# Patient Record
Sex: FEMALE | Race: BLACK OR AFRICAN AMERICAN | NOT HISPANIC OR LATINO
[De-identification: names, ages, dates, MRNs, and addresses within clinical notes are randomized per-mention and may not be internally consistent; named-entity substitution may affect disease eponyms.]

---

## 2018-12-19 PROBLEM — Z00.00 ENCOUNTER FOR PREVENTIVE HEALTH EXAMINATION: Status: ACTIVE | Noted: 2018-12-19

## 2019-01-04 ENCOUNTER — APPOINTMENT (OUTPATIENT)
Dept: OBGYN | Facility: CLINIC | Age: 39
End: 2019-01-04
Payer: COMMERCIAL

## 2019-01-04 PROCEDURE — 99242 OFF/OP CONSLTJ NEW/EST SF 20: CPT

## 2019-01-23 ENCOUNTER — OUTPATIENT (OUTPATIENT)
Dept: OUTPATIENT SERVICES | Facility: HOSPITAL | Age: 39
LOS: 1 days | End: 2019-01-23
Payer: COMMERCIAL

## 2019-01-23 VITALS
TEMPERATURE: 98 F | HEIGHT: 64 IN | OXYGEN SATURATION: 98 % | SYSTOLIC BLOOD PRESSURE: 130 MMHG | RESPIRATION RATE: 16 BRPM | DIASTOLIC BLOOD PRESSURE: 82 MMHG | HEART RATE: 78 BPM | WEIGHT: 188.05 LBS

## 2019-01-23 DIAGNOSIS — Z01.818 ENCOUNTER FOR OTHER PREPROCEDURAL EXAMINATION: ICD-10-CM

## 2019-01-23 DIAGNOSIS — D25.0 SUBMUCOUS LEIOMYOMA OF UTERUS: ICD-10-CM

## 2019-01-23 DIAGNOSIS — D25.9 LEIOMYOMA OF UTERUS, UNSPECIFIED: ICD-10-CM

## 2019-01-23 PROCEDURE — G0463: CPT

## 2019-01-23 RX ORDER — LIDOCAINE HCL 20 MG/ML
0.2 VIAL (ML) INJECTION ONCE
Qty: 0 | Refills: 0 | Status: DISCONTINUED | OUTPATIENT
Start: 2019-01-28 | End: 2019-02-12

## 2019-01-23 RX ORDER — SODIUM CHLORIDE 9 MG/ML
3 INJECTION INTRAMUSCULAR; INTRAVENOUS; SUBCUTANEOUS EVERY 8 HOURS
Qty: 0 | Refills: 0 | Status: DISCONTINUED | OUTPATIENT
Start: 2019-01-28 | End: 2019-02-12

## 2019-01-23 NOTE — H&P PST ADULT - NSANTHOSAYNRD_GEN_A_CORE
No. GENEVIEVE screening performed.  STOP BANG Legend: 0-2 = LOW Risk; 3-4 = INTERMEDIATE Risk; 5-8 = HIGH Risk

## 2019-01-23 NOTE — H&P PST ADULT - HISTORY OF PRESENT ILLNESS
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

## 2019-01-24 ENCOUNTER — EMERGENCY (EMERGENCY)
Facility: HOSPITAL | Age: 39
LOS: 1 days | Discharge: HOME | End: 2019-01-24
Attending: EMERGENCY MEDICINE
Payer: COMMERCIAL

## 2019-01-24 ENCOUNTER — TRANSCRIPTION ENCOUNTER (OUTPATIENT)
Age: 39
End: 2019-01-24

## 2019-01-24 VITALS
OXYGEN SATURATION: 98 % | TEMPERATURE: 98 F | SYSTOLIC BLOOD PRESSURE: 123 MMHG | RESPIRATION RATE: 18 BRPM | HEART RATE: 62 BPM | DIASTOLIC BLOOD PRESSURE: 83 MMHG

## 2019-01-24 VITALS
WEIGHT: 190.04 LBS | OXYGEN SATURATION: 100 % | RESPIRATION RATE: 18 BRPM | SYSTOLIC BLOOD PRESSURE: 138 MMHG | TEMPERATURE: 98 F | DIASTOLIC BLOOD PRESSURE: 95 MMHG | HEART RATE: 82 BPM | HEIGHT: 66 IN

## 2019-01-24 LAB
ALBUMIN SERPL ELPH-MCNC: 4.1 G/DL — SIGNIFICANT CHANGE UP (ref 3.3–5)
ALP SERPL-CCNC: 63 U/L — SIGNIFICANT CHANGE UP (ref 40–120)
ALT FLD-CCNC: 15 U/L — SIGNIFICANT CHANGE UP (ref 10–45)
ANION GAP SERPL CALC-SCNC: 10 MMOL/L — SIGNIFICANT CHANGE UP (ref 5–17)
AST SERPL-CCNC: 14 U/L — SIGNIFICANT CHANGE UP (ref 10–40)
BILIRUB SERPL-MCNC: 0.3 MG/DL — SIGNIFICANT CHANGE UP (ref 0.2–1.2)
BUN SERPL-MCNC: 10 MG/DL — SIGNIFICANT CHANGE UP (ref 7–23)
CALCIUM SERPL-MCNC: 9 MG/DL — SIGNIFICANT CHANGE UP (ref 8.4–10.5)
CHLORIDE SERPL-SCNC: 104 MMOL/L — SIGNIFICANT CHANGE UP (ref 96–108)
CO2 SERPL-SCNC: 25 MMOL/L — SIGNIFICANT CHANGE UP (ref 22–31)
CREAT SERPL-MCNC: 0.81 MG/DL — SIGNIFICANT CHANGE UP (ref 0.5–1.3)
GLUCOSE SERPL-MCNC: 84 MG/DL — SIGNIFICANT CHANGE UP (ref 70–99)
HCG UR QL: NEGATIVE — SIGNIFICANT CHANGE UP
HCT VFR BLD CALC: 32.3 % — LOW (ref 34.5–45)
HGB BLD-MCNC: 11 G/DL — LOW (ref 11.5–15.5)
MCHC RBC-ENTMCNC: 29.7 PG — SIGNIFICANT CHANGE UP (ref 27–34)
MCHC RBC-ENTMCNC: 33.9 GM/DL — SIGNIFICANT CHANGE UP (ref 32–36)
MCV RBC AUTO: 87.6 FL — SIGNIFICANT CHANGE UP (ref 80–100)
PLATELET # BLD AUTO: 327 K/UL — SIGNIFICANT CHANGE UP (ref 150–400)
POTASSIUM SERPL-MCNC: 3.4 MMOL/L — LOW (ref 3.5–5.3)
POTASSIUM SERPL-SCNC: 3.4 MMOL/L — LOW (ref 3.5–5.3)
PROT SERPL-MCNC: 7.4 G/DL — SIGNIFICANT CHANGE UP (ref 6–8.3)
RBC # BLD: 3.69 M/UL — LOW (ref 3.8–5.2)
RBC # FLD: 13.8 % — SIGNIFICANT CHANGE UP (ref 10.3–14.5)
SODIUM SERPL-SCNC: 139 MMOL/L — SIGNIFICANT CHANGE UP (ref 135–145)
WBC # BLD: 6.3 K/UL — SIGNIFICANT CHANGE UP (ref 3.8–10.5)
WBC # FLD AUTO: 6.3 K/UL — SIGNIFICANT CHANGE UP (ref 3.8–10.5)

## 2019-01-24 PROCEDURE — 81025 URINE PREGNANCY TEST: CPT

## 2019-01-24 PROCEDURE — 85027 COMPLETE CBC AUTOMATED: CPT

## 2019-01-24 PROCEDURE — 71046 X-RAY EXAM CHEST 2 VIEWS: CPT

## 2019-01-24 PROCEDURE — 80053 COMPREHEN METABOLIC PANEL: CPT

## 2019-01-24 PROCEDURE — 93010 ELECTROCARDIOGRAM REPORT: CPT | Mod: NC

## 2019-01-24 PROCEDURE — 71046 X-RAY EXAM CHEST 2 VIEWS: CPT | Mod: 26

## 2019-01-24 PROCEDURE — 93005 ELECTROCARDIOGRAM TRACING: CPT

## 2019-01-24 PROCEDURE — 99284 EMERGENCY DEPT VISIT MOD MDM: CPT | Mod: 25

## 2019-01-24 RX ORDER — POTASSIUM CHLORIDE 20 MEQ
40 PACKET (EA) ORAL ONCE
Qty: 0 | Refills: 0 | Status: DISCONTINUED | OUTPATIENT
Start: 2019-01-24 | End: 2019-01-28

## 2019-01-24 RX ORDER — IBUPROFEN 200 MG
600 TABLET ORAL ONCE
Qty: 0 | Refills: 0 | Status: DISCONTINUED | OUTPATIENT
Start: 2019-01-24 | End: 2019-01-24

## 2019-01-24 NOTE — ED PROVIDER NOTE - ATTENDING CONTRIBUTION TO CARE
------------ATTENDING NOTE------------   pt c/o gradual onset mild constant L sided chest pain, some radiation to L shoulder, constant past 4 hrs, no sob/dyspnea or exertional symptoms, has daily chronic uterine bleeding from fibroid and felt lightheaded near passing out 4 days ago, HD stable, clear chest, equal distal pulses -->  - Iván Ortega MD   ---------------------------------------------- ------------ATTENDING NOTE------------   pt c/o gradual onset mild constant L sided chest pain, some radiation to L shoulder, constant past 4 hrs, no sob/dyspnea or exertional symptoms, has daily chronic uterine bleeding from fibroid and felt lightheaded near passing out 4 days ago, HD stable, clear chest, equal distal pulses, no pelvic pain, declining  exam --> labs / imaging wnl, remained asymptomatic entire ED course after initial evaluation w/ NSR and nml VS on cardiac monitor, no additional complaints / concerns, benign repeat exams, in depth dw all about ddx, tx, hollis, continued close outpt fu.  - Iván Ortega MD   ----------------------------------------------

## 2019-01-24 NOTE — ED PROVIDER NOTE - NSFOLLOWUPINSTRUCTIONS_ED_ALL_ED_FT
Follow up with your Primary Doctor and OBGYN in next week for reevaluation.    Stay well hydrated, take time changing position / standing up.    Seek immediate medical care for new/worsening symptoms/concerns.

## 2019-01-24 NOTE — ED CLERICAL - NS ED CLERK NOTE PRE-ARRIVAL INFORMATION; ADDITIONAL PRE-ARRIVAL INFORMATION
CC/Reason For referral:chest pain  Preferred Consultant(if applicable):n/a  Who admits for you (if needed):n/a  Do you have documents you would like to fax over?no  Would you still like to speak to an ED attending?no but please give a call back to LUCA Bennett  [phone]

## 2019-01-24 NOTE — ED PROVIDER NOTE - OBJECTIVE STATEMENT
38F p/w chest pain for 3 hours. She describes a sharp pain in the chest just below her left shoulder. Associated w/ dyspnea for weeks-months which is not worse today. Notes hx irregular vaginal bleeding, dx submucosal fibroid scheduled for removal in a few days. No change in bleeding pattern or worse bleeding recently.    PMH: fibroids  Meds: OCP

## 2019-01-24 NOTE — ED PROVIDER NOTE - CARE PLAN
Principal Discharge DX:	Chest pain of uncertain etiology  Secondary Diagnosis:	Dysfunctional uterine bleeding

## 2019-01-27 ENCOUNTER — TRANSCRIPTION ENCOUNTER (OUTPATIENT)
Age: 39
End: 2019-01-27

## 2019-01-28 ENCOUNTER — OUTPATIENT (OUTPATIENT)
Dept: OUTPATIENT SERVICES | Facility: HOSPITAL | Age: 39
LOS: 1 days | End: 2019-01-28
Payer: COMMERCIAL

## 2019-01-28 ENCOUNTER — RESULT REVIEW (OUTPATIENT)
Age: 39
End: 2019-01-28

## 2019-01-28 ENCOUNTER — APPOINTMENT (OUTPATIENT)
Dept: OBGYN | Facility: HOSPITAL | Age: 39
End: 2019-01-28

## 2019-01-28 VITALS
OXYGEN SATURATION: 100 % | HEART RATE: 86 BPM | SYSTOLIC BLOOD PRESSURE: 120 MMHG | TEMPERATURE: 97 F | RESPIRATION RATE: 16 BRPM | DIASTOLIC BLOOD PRESSURE: 71 MMHG

## 2019-01-28 VITALS
HEART RATE: 83 BPM | DIASTOLIC BLOOD PRESSURE: 78 MMHG | TEMPERATURE: 99 F | RESPIRATION RATE: 16 BRPM | HEIGHT: 64 IN | OXYGEN SATURATION: 100 % | SYSTOLIC BLOOD PRESSURE: 115 MMHG | WEIGHT: 188.05 LBS

## 2019-01-28 DIAGNOSIS — D25.0 SUBMUCOUS LEIOMYOMA OF UTERUS: ICD-10-CM

## 2019-01-28 PROCEDURE — 58561 HYSTEROSCOPY REMOVE MYOMA: CPT

## 2019-01-28 PROCEDURE — 88305 TISSUE EXAM BY PATHOLOGIST: CPT

## 2019-01-28 PROCEDURE — 88305 TISSUE EXAM BY PATHOLOGIST: CPT | Mod: 26

## 2019-01-28 RX ORDER — SODIUM CHLORIDE 9 MG/ML
1000 INJECTION, SOLUTION INTRAVENOUS
Qty: 0 | Refills: 0 | Status: DISCONTINUED | OUTPATIENT
Start: 2019-01-28 | End: 2019-02-12

## 2019-01-28 RX ORDER — ACETAMINOPHEN 500 MG
1000 TABLET ORAL ONCE
Qty: 0 | Refills: 0 | Status: COMPLETED | OUTPATIENT
Start: 2019-01-28 | End: 2019-01-28

## 2019-01-28 RX ORDER — CELECOXIB 200 MG/1
200 CAPSULE ORAL ONCE
Qty: 0 | Refills: 0 | Status: COMPLETED | OUTPATIENT
Start: 2019-01-28 | End: 2019-01-28

## 2019-01-28 RX ORDER — ONDANSETRON 8 MG/1
4 TABLET, FILM COATED ORAL ONCE
Qty: 0 | Refills: 0 | Status: COMPLETED | OUTPATIENT
Start: 2019-01-28 | End: 2019-01-28

## 2019-01-28 RX ORDER — OXYCODONE HYDROCHLORIDE 5 MG/1
5 TABLET ORAL ONCE
Qty: 0 | Refills: 0 | Status: DISCONTINUED | OUTPATIENT
Start: 2019-01-28 | End: 2019-01-28

## 2019-01-28 RX ADMIN — ONDANSETRON 4 MILLIGRAM(S): 8 TABLET, FILM COATED ORAL at 10:46

## 2019-01-28 RX ADMIN — CELECOXIB 200 MILLIGRAM(S): 200 CAPSULE ORAL at 11:10

## 2019-01-28 RX ADMIN — Medication 1000 MILLIGRAM(S): at 08:42

## 2019-01-28 RX ADMIN — CELECOXIB 200 MILLIGRAM(S): 200 CAPSULE ORAL at 08:42

## 2019-01-28 RX ADMIN — OXYCODONE HYDROCHLORIDE 5 MILLIGRAM(S): 5 TABLET ORAL at 10:46

## 2019-01-28 RX ADMIN — CELECOXIB 200 MILLIGRAM(S): 200 CAPSULE ORAL at 10:46

## 2019-01-28 RX ADMIN — OXYCODONE HYDROCHLORIDE 5 MILLIGRAM(S): 5 TABLET ORAL at 11:10

## 2019-01-28 NOTE — ASU DISCHARGE PLAN (ADULT/PEDIATRIC). - MEDICATION SUMMARY - MEDICATIONS TO TAKE
I will START or STAY ON the medications listed below when I get home from the hospital:    Tylenol 325 mg oral tablet  -- 2 tab(s) by mouth every 6 hours, As Needed  -- Indication: For pain    ibuprofen 400 mg oral tablet  -- 1 tab(s) by mouth every 6 hours, As Needed  -- Indication: For pain

## 2019-01-28 NOTE — ASU DISCHARGE PLAN (ADULT/PEDIATRIC). - ACTIVITY LEVEL
no heavy lifting/no intercourse/weight bearing as tolerated/nothing per vagina/no douching/nothing per rectum/no tub baths/no tampons x 2 weeks/nothing per vagina/nothing per rectum/no tampons/no intercourse/no heavy lifting/weight bearing as tolerated/no tub baths/no douching

## 2019-01-28 NOTE — ASU DISCHARGE PLAN (ADULT/PEDIATRIC). - NOTIFY
Inability to Tolerate Liquids or Foods/Bleeding that does not stop/Pain not relieved by Medications/GYN Fever>100.4

## 2019-01-28 NOTE — BRIEF OPERATIVE NOTE - PROCEDURE
<<-----Click on this checkbox to enter Procedure Resection of submucosal fibroid of uterus  01/28/2019    Active  [email or web address]  Operative hysteroscopy with dilation and curettage of uterus if indicated  01/28/2019    Active  RAYRAY

## 2019-01-28 NOTE — ASU DISCHARGE PLAN (ADULT/PEDIATRIC). - ITEMS TO FOLLOWUP WITH YOUR PHYSICIAN'S
Expect abdominal cramping/pain and spotting for the next weeks. Take ibuprofen and Tylenol for cramping. Use a pad as needed. Call your physician or go to the emergency room if you experience any of the following: heavy vaginal bleeding (soaking more than 2 pads in 1 hour for 2 hours), fever, chills, nausea, vomiting, or pain that is not controlled by medication. Follow-up with Dr. Xie in 2 weeks.

## 2019-01-30 LAB — SURGICAL PATHOLOGY STUDY: SIGNIFICANT CHANGE UP

## 2019-02-11 ENCOUNTER — APPOINTMENT (OUTPATIENT)
Dept: OBGYN | Facility: CLINIC | Age: 39
End: 2019-02-11
Payer: COMMERCIAL

## 2019-02-11 PROCEDURE — 99213 OFFICE O/P EST LOW 20 MIN: CPT

## 2019-02-12 PROBLEM — D25.9 LEIOMYOMA OF UTERUS, UNSPECIFIED: Chronic | Status: ACTIVE | Noted: 2019-01-23

## 2019-03-19 ENCOUNTER — APPOINTMENT (OUTPATIENT)
Dept: OBGYN | Facility: CLINIC | Age: 39
End: 2019-03-19
Payer: COMMERCIAL

## 2019-03-19 PROCEDURE — 58340 CATHETER FOR HYSTEROGRAPHY: CPT

## 2019-03-19 PROCEDURE — 76831 ECHO EXAM UTERUS: CPT

## 2019-04-16 ENCOUNTER — APPOINTMENT (OUTPATIENT)
Dept: OBGYN | Facility: CLINIC | Age: 39
End: 2019-04-16
Payer: COMMERCIAL

## 2019-04-16 PROCEDURE — 99214 OFFICE O/P EST MOD 30 MIN: CPT

## 2019-06-06 ENCOUNTER — OUTPATIENT (OUTPATIENT)
Dept: OUTPATIENT SERVICES | Facility: HOSPITAL | Age: 39
LOS: 1 days | End: 2019-06-06
Payer: COMMERCIAL

## 2019-06-06 VITALS
HEART RATE: 65 BPM | HEIGHT: 66 IN | SYSTOLIC BLOOD PRESSURE: 129 MMHG | OXYGEN SATURATION: 100 % | DIASTOLIC BLOOD PRESSURE: 86 MMHG | RESPIRATION RATE: 20 BRPM | WEIGHT: 192.02 LBS | TEMPERATURE: 99 F

## 2019-06-06 DIAGNOSIS — D25.9 LEIOMYOMA OF UTERUS, UNSPECIFIED: ICD-10-CM

## 2019-06-06 DIAGNOSIS — Z01.818 ENCOUNTER FOR OTHER PREPROCEDURAL EXAMINATION: ICD-10-CM

## 2019-06-06 DIAGNOSIS — D25.0 SUBMUCOUS LEIOMYOMA OF UTERUS: ICD-10-CM

## 2019-06-06 DIAGNOSIS — Z98.890 OTHER SPECIFIED POSTPROCEDURAL STATES: Chronic | ICD-10-CM

## 2019-06-06 LAB
HCT VFR BLD CALC: 34.3 % — LOW (ref 34.5–45)
HGB BLD-MCNC: 10.8 G/DL — LOW (ref 11.5–15.5)
MCHC RBC-ENTMCNC: 27.3 PG — SIGNIFICANT CHANGE UP (ref 27–34)
MCHC RBC-ENTMCNC: 31.5 GM/DL — LOW (ref 32–36)
MCV RBC AUTO: 86.8 FL — SIGNIFICANT CHANGE UP (ref 80–100)
PLATELET # BLD AUTO: 383 K/UL — SIGNIFICANT CHANGE UP (ref 150–400)
RBC # BLD: 3.95 M/UL — SIGNIFICANT CHANGE UP (ref 3.8–5.2)
RBC # FLD: 15.5 % — HIGH (ref 10.3–14.5)
WBC # BLD: 6.64 K/UL — SIGNIFICANT CHANGE UP (ref 3.8–10.5)
WBC # FLD AUTO: 6.64 K/UL — SIGNIFICANT CHANGE UP (ref 3.8–10.5)

## 2019-06-06 PROCEDURE — G0463: CPT

## 2019-06-06 PROCEDURE — 85027 COMPLETE CBC AUTOMATED: CPT

## 2019-06-06 RX ORDER — LIDOCAINE HCL 20 MG/ML
0.2 VIAL (ML) INJECTION ONCE
Refills: 0 | Status: DISCONTINUED | OUTPATIENT
Start: 2019-06-13 | End: 2019-06-28

## 2019-06-06 RX ORDER — ACETAMINOPHEN 500 MG
2 TABLET ORAL
Qty: 0 | Refills: 0 | DISCHARGE

## 2019-06-06 RX ORDER — SODIUM CHLORIDE 9 MG/ML
3 INJECTION INTRAMUSCULAR; INTRAVENOUS; SUBCUTANEOUS EVERY 8 HOURS
Refills: 0 | Status: DISCONTINUED | OUTPATIENT
Start: 2019-06-13 | End: 2019-06-28

## 2019-06-06 RX ORDER — IBUPROFEN 200 MG
1 TABLET ORAL
Qty: 0 | Refills: 0 | DISCHARGE

## 2019-06-06 NOTE — H&P PST ADULT - HISTORY OF PRESENT ILLNESS
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 Had sonogram that revealed submucous leiomyoma . Now coming in for D& C, Operative hysteroscopy, Myomectomy on 19.

## 2019-06-06 NOTE — H&P PST ADULT - RS GEN PE MLT RESP DETAILS PC
normal/respirations non-labored/airway patent/clear to auscultation bilaterally/breath sounds equal/good air movement

## 2019-06-06 NOTE — H&P PST ADULT - NSICDXPROBLEM_GEN_ALL_CORE_FT
PROBLEM DIAGNOSES  Problem: Uterine fibroid  Assessment and Plan: D& C, Operative hysteroscopy, Myomectomy on 6/13/19.

## 2019-06-12 ENCOUNTER — TRANSCRIPTION ENCOUNTER (OUTPATIENT)
Age: 39
End: 2019-06-12

## 2019-06-13 ENCOUNTER — RESULT REVIEW (OUTPATIENT)
Age: 39
End: 2019-06-13

## 2019-06-13 ENCOUNTER — APPOINTMENT (OUTPATIENT)
Dept: OBGYN | Facility: HOSPITAL | Age: 39
End: 2019-06-13

## 2019-06-13 ENCOUNTER — APPOINTMENT (OUTPATIENT)
Dept: ULTRASOUND IMAGING | Facility: HOSPITAL | Age: 39
End: 2019-06-13

## 2019-06-13 ENCOUNTER — OUTPATIENT (OUTPATIENT)
Dept: OUTPATIENT SERVICES | Facility: HOSPITAL | Age: 39
LOS: 1 days | End: 2019-06-13
Payer: COMMERCIAL

## 2019-06-13 VITALS
SYSTOLIC BLOOD PRESSURE: 141 MMHG | OXYGEN SATURATION: 100 % | TEMPERATURE: 98 F | DIASTOLIC BLOOD PRESSURE: 90 MMHG | RESPIRATION RATE: 22 BRPM | HEART RATE: 90 BPM

## 2019-06-13 VITALS
HEART RATE: 77 BPM | OXYGEN SATURATION: 100 % | DIASTOLIC BLOOD PRESSURE: 76 MMHG | WEIGHT: 192.02 LBS | TEMPERATURE: 99 F | SYSTOLIC BLOOD PRESSURE: 112 MMHG | RESPIRATION RATE: 18 BRPM | HEIGHT: 66 IN

## 2019-06-13 DIAGNOSIS — Z98.890 OTHER SPECIFIED POSTPROCEDURAL STATES: Chronic | ICD-10-CM

## 2019-06-13 DIAGNOSIS — D25.0 SUBMUCOUS LEIOMYOMA OF UTERUS: ICD-10-CM

## 2019-06-13 PROCEDURE — 58561 HYSTEROSCOPY REMOVE MYOMA: CPT | Mod: 80

## 2019-06-13 PROCEDURE — 88305 TISSUE EXAM BY PATHOLOGIST: CPT | Mod: 26

## 2019-06-13 PROCEDURE — 58561 HYSTEROSCOPY REMOVE MYOMA: CPT

## 2019-06-13 RX ORDER — CELECOXIB 200 MG/1
200 CAPSULE ORAL ONCE
Refills: 0 | Status: COMPLETED | OUTPATIENT
Start: 2019-06-13 | End: 2019-06-13

## 2019-06-13 RX ORDER — OXYCODONE HYDROCHLORIDE 5 MG/1
5 TABLET ORAL ONCE
Refills: 0 | Status: DISCONTINUED | OUTPATIENT
Start: 2019-06-13 | End: 2019-06-13

## 2019-06-13 RX ORDER — ONDANSETRON 8 MG/1
4 TABLET, FILM COATED ORAL ONCE
Refills: 0 | Status: DISCONTINUED | OUTPATIENT
Start: 2019-06-13 | End: 2019-06-28

## 2019-06-13 RX ORDER — HYDROMORPHONE HYDROCHLORIDE 2 MG/ML
0.25 INJECTION INTRAMUSCULAR; INTRAVENOUS; SUBCUTANEOUS
Refills: 0 | Status: DISCONTINUED | OUTPATIENT
Start: 2019-06-13 | End: 2019-06-13

## 2019-06-13 RX ORDER — SODIUM CHLORIDE 9 MG/ML
1000 INJECTION, SOLUTION INTRAVENOUS
Refills: 0 | Status: DISCONTINUED | OUTPATIENT
Start: 2019-06-13 | End: 2019-06-28

## 2019-06-13 RX ORDER — ACETAMINOPHEN 500 MG
1000 TABLET ORAL ONCE
Refills: 0 | Status: COMPLETED | OUTPATIENT
Start: 2019-06-13 | End: 2019-06-13

## 2019-06-13 RX ADMIN — Medication 1000 MILLIGRAM(S): at 14:00

## 2019-06-13 RX ADMIN — CELECOXIB 200 MILLIGRAM(S): 200 CAPSULE ORAL at 14:00

## 2019-06-13 RX ADMIN — CELECOXIB 200 MILLIGRAM(S): 200 CAPSULE ORAL at 17:10

## 2019-06-13 NOTE — BRIEF OPERATIVE NOTE - NSICDXBRIEFPOSTOP_GEN_ALL_CORE_FT
POST-OP DIAGNOSIS:  Submucous myoma of uterus 13-Jun-2019 16:46:53  Dena Bernardo  Heavy menstrual bleeding 13-Jun-2019 16:46:46  Dena Bernardo

## 2019-06-13 NOTE — BRIEF OPERATIVE NOTE - NSICDXBRIEFPREOP_GEN_ALL_CORE_FT
PRE-OP DIAGNOSIS:  Submucous myoma of uterus 13-Jun-2019 16:46:37  Dena Bernardo  Heavy menstrual bleeding 13-Jun-2019 16:46:30  Dena Bernardo

## 2019-06-13 NOTE — ASU DISCHARGE PLAN (ADULT/PEDIATRIC) - CARE PROVIDER_API CALL
Rivka Xie (MD)  Obstetrics and Gynecology  [address] [place] [address], [address]  [place] [address]  Phone: [phone]  Fax: [phone]  Follow Up Time:

## 2019-06-13 NOTE — BRIEF OPERATIVE NOTE - NSICDXBRIEFPROCEDURE_GEN_ALL_CORE_FT
PROCEDURES:  Hysteroscopy, with dilation and curettage 13-Jun-2019 16:46:21  Dena Bernardo  Hysteroscopic myomectomy 13-Jun-2019 16:46:09  Dena Bernardo

## 2019-06-13 NOTE — ASU DISCHARGE PLAN (ADULT/PEDIATRIC) - CALL YOUR DOCTOR IF YOU HAVE ANY OF THE FOLLOWING:
Swelling that gets worse/Fever greater than (need to indicate Fahrenheit or Celsius)/Excessive diarrhea/Inability to tolerate liquids or foods/Bleeding that does not stop/Numbness, tingling, color or temperature change to extremity/Nausea and vomiting that does not stop/Pain not relieved by Medications/Unable to urinate/Wound/Surgical Site with redness, or foul smelling discharge or pus/Increased irritability or sluggishness

## 2019-06-19 LAB — SURGICAL PATHOLOGY STUDY: SIGNIFICANT CHANGE UP

## 2019-06-24 ENCOUNTER — APPOINTMENT (OUTPATIENT)
Dept: OBGYN | Facility: CLINIC | Age: 39
End: 2019-06-24
Payer: COMMERCIAL

## 2019-06-24 PROCEDURE — 99213 OFFICE O/P EST LOW 20 MIN: CPT

## 2019-06-25 PROCEDURE — 88305 TISSUE EXAM BY PATHOLOGIST: CPT

## 2019-06-25 PROCEDURE — 58561 HYSTEROSCOPY REMOVE MYOMA: CPT

## 2020-06-10 NOTE — ED ADULT NURSE NOTE - OBJECTIVE STATEMENT
83
38 y.o F arrived to ED c/o L upper chest/L shoulder pain beginning today x4 hours. A&Ox3 ambulatory. Pt states she has also had ongoing intermittent vaginal bleeding r/t uterine fibroid diagnosed beginning of last year. also endorses intermittent SOB recently worse on exertion. Respirations nonlabored pt in no acute distress abdomen soft neuro intact able to move all extremities. Denies N/V/D, fevers, chills, HA, dizziness, pain/burning upon urination, abdominal pain, back pain. Safety and comfort provided/maintained.

## 2020-11-24 NOTE — ASU PREOP CHECKLIST - IDENTIFICATION BAND VERIFIED
Chief Complaint   Patient presents with     Port Draw     Labs drawn from port by RN in lab. Vitals taken. Checked into next appointment.      Port accessed with 20 gauge gripper needle by RN in lab.  Port flushed with saline and heparin.  Vital signs taken.  Pt checked in to next appointment.    Miladis Roldan RN    
done

## 2021-01-26 NOTE — ASU PREOP CHECKLIST - TEMPERATURE IN FAHRENHEIT (DEGREES F)
[address] Mejia Howard)  Urology  [address] [place] [address], [address]  [place] [address]  Phone: [phone]  Fax: [phone]  Follow Up Time: 1-3 Days    Vern Kemp)  EndocrinologyMetabDiabetes; Internal Medicine  [address] [place] [address]  Phone: [phone]  Fax: [phone]  Follow Up Time: 1-3 Days

## 2021-05-05 NOTE — PRE-ANESTHESIA EVALUATION ADULT - NSANTHOSAYNRD_GEN_A_CORE
Orthopaedic Surgery - Office Note  Marta Esteban (27 y o  female)   : 2001   MRN: [id number]  Encounter Date: 2021    Chief Complaint   Patient presents with    Right Knee - Follow-up       Assessment / Plan  Right knee grade 1 LCL and popliteus strains and nondisplaced lateral tibial plateau fracture    · Activity as tolerated   · Continue to monitor "pop" but it will likely resolve  If it continues or worsens we will order an MRI to further evaluate  Her knee feels stable upon physical exam  · School note given excusing her absence this morning  Return if symptoms worsen or fail to improve  History of Present Illness  Marta Esteban is a 27 y o  female who presents for follow right knee grade 1 LCL and popliteus strains and nondisplaced lateral tibial plateau fracture DOI 2021  She has d/c the use of the brace and crutches with no pain or discomfort  She has tried some running but hasn't returned to her full level of previous activity as she feels a little uncomfortable doing so without the help of PT  She states her knee does feel stable  She does sometimes experience a "pop" around her lateral tibial plateau, which she feels is random and doesn't hurt  Overall, she does she feel that she is doing better  She denies any radiating symptoms  Review of Systems  Pertinent items are noted in HPI  All other systems were reviewed and are negative  Physical Exam  /74   Pulse 80   Ht 5' 4" (1 626 m)   Wt 55 8 kg (123 lb)   BMI 21 11 kg/m²   Cons: Appears well  No apparent distress  Psych: Alert  Oriented x3  Mood and affect normal   Eyes: PERRLA, EOMI  Resp: Normal effort  No audible wheezing or stridor  CV: Palpable pulse  No discernable arrhythmia  No LE edema  Lymph:  No palpable cervical, axillary, or inguinal lymphadenopathy  Skin: Warm  No palpable masses  No visible lesions  Neuro: Normal muscle tone  Normal and symmetric DTR's       Right Knee Exam  Alignment: Normal knee alignment  Inspection:  No swelling  No erythema  No ecchymosis  Palpation:  No tenderness  No effusion  ROM:  Knee Extension 0  Knee Flexion 135  Strength:  5/5 quadriceps and hamstrings  Stability:  (-) Varus instability  (-) Valgus instability  (-) Lachman  Tests:  (-) Mekhi  Patella:  Normal patellar mobility  Neurovascular:  Sensation intact in DP/SP/Demarco/Sa/T nerve distributions  2+ DP & PT pulses  Gait:  Normal     Studies Reviewed  I have personally reviewed pertinent films in PACS  XR of right knee - from 2/4/2021 No acute fractures or dislocations  MRI of right knee - from 2/8/2021 Nondisplaced fracture of the lateral tibial plateau, grade I strains of LCL and popliteus tendon, fibular head bruising    Procedures  No procedures today  Medical, Surgical, Family, and Social History  The patient's medical history, family history, and social history, were reviewed and updated as appropriate      Past Medical History:   Diagnosis Date    Migraines        Past Surgical History:   Procedure Laterality Date    WISDOM TOOTH EXTRACTION         Family History   Problem Relation Age of Onset    Migraines Mother     Depression Father     Anxiety disorder Father     Thyroid disease Maternal Grandmother     Uterine cancer Maternal Grandmother     No Known Problems Maternal Grandfather     No Known Problems Paternal Grandmother     No Known Problems Paternal Grandfather     Mental illness Neg Hx     Substance Abuse Neg Hx        Social History     Occupational History    Not on file   Tobacco Use    Smoking status: Never Smoker    Smokeless tobacco: Never Used   Substance and Sexual Activity    Alcohol use: Never     Frequency: Never    Drug use: Never    Sexual activity: Yes     Partners: Male     Birth control/protection: Condom Male, Pill     Comment: lives with Mom, brother (1/2), step dad       No Known Allergies      Current Outpatient Medications:    No. GENEVIEVE screening performed.  STOP BANG Legend: 0-2 = LOW Risk; 3-4 = INTERMEDIATE Risk; 5-8 = HIGH Risk drospirenone-ethinyl estradiol (BINDU) 3-0 03 MG per tablet, Take 1 tablet by mouth daily (Patient not taking: Reported on 5/5/2021), Disp: 28 tablet, Rfl: 12    terconazole (TERAZOL 7) 0 4 % vaginal cream, Insert 1 applicator into the vagina daily at bedtime (Patient not taking: Reported on 5/5/2021), Disp: 45 g, Rfl: 0      [employer]    I,:  Cindi Herrera am acting as a scribe while in the presence of the attending physician :       I,:  Quintin Mclaughlin MD personally performed the services described in this documentation    as scribed in my presence :

## 2021-05-09 NOTE — ASU PATIENT PROFILE, ADULT - REASON FOR ADMISSION, PROFILE
Problem: At Risk for Falls  Goal: # Patient does not fall  Outcome: Outcome Met, Continue evaluating goal progress toward completion  Goal: # Takes action to control fall-related risks  Outcome: Outcome Met, Continue evaluating goal progress toward completion  Goal: # Verbalizes understanding of fall risk/precautions  Description: Document education using the patient education activity  Outcome: Outcome Met, Continue evaluating goal progress toward completion     Problem: At Risk for Injury Due to Fall  Goal: # Patient does not fall  Outcome: Outcome Met, Continue evaluating goal progress toward completion  Goal: # Takes action to control condition specific risks  Outcome: Outcome Met, Continue evaluating goal progress toward completion  Goal: # Verbalizes understanding of fall-related injury personal risks  Description: Document education using the patient education activity  Outcome: Outcome Met, Continue evaluating goal progress toward completion      for D&C, operative hysteroscopy, myomectomy

## 2022-02-28 NOTE — H&P PST ADULT - AS BP NONINV METHOD
[facility] VISIT NOTE       Chief Complaint   Patient presents with   • Back Pain       History Of Present Illness  Ilsa is a 74 year old female presenting with right sided back pain started this morning.no n/v/c/sob. Denies abnormal bowel and abnormal urinary sympotms       Past Medical History  Past Medical History:   Diagnosis Date   • Allergy         Surgical History  Past Surgical History:   Procedure Laterality Date   • Fracture surgery     • Hernia repair     • Hysterectomy     • Rotator cuff repair Right    • Tonsillectomy          Social History  Social History     Tobacco Use   • Smoking status: Never Smoker   • Smokeless tobacco: Never Used   Vaping Use   • Vaping Use: never used   Substance Use Topics   • Alcohol use: Never   • Drug use: Never       Family History    Family History   Problem Relation Age of Onset   • Diabetes Mother    • Stroke Brother         FH reviewed and negative for any heart or lung disease, bleeding disorders, or issues related to this complaint.     Allergies  ALLERGIES:  Aspirin    Medications  Current Outpatient Medications   Medication Sig   • sulfamethoxazole-trimethoprim (Bactrim DS) 800-160 MG per tablet Take 1 tablet by mouth 2 times daily for 5 days.   • fluconazole (DIFLUCAN) 150 MG tablet Take 1 tablet by mouth 1 time for 1 dose.   • cyclobenzaprine (FLEXERIL) 10 MG tablet Take 1 tablet by mouth 3 times daily as needed for Muscle spasms.   • atorvastatin (LIPITOR) 10 MG tablet Take 1 tablet by mouth once daily   • PEG 3350-KCl-NaBcb-NaCl-NaSulf (PEG-3350 and electrolytes) 236 g powder for oral solution Take 4,000 mLs by mouth 1 time. Drink first half of prep at 5 pm the day before procedure. Drink second half 5 hours prior to procedure.   • bisacodyl (DULCOLAX) 5 MG EC tablet Take 1 tablet by mouth 1 time.   • Ascorbic Acid (VITAMIN C PO) Take 1 tablet by mouth.     No current facility-administered medications for this visit.        Review of Systems  Constitutional:  Negative for fever and chills.  Skin: Negative for rash.  HEENT: Negative for eye drainage, ear pain, rhinorrhea, sinus congestion, or sore throat.  Respiratory: Negative wheezing, shortness of breath, or cough.  Cardiovascular: Negative for chest pain, chest pressure, palpitations or diaphoresis.  Gastrointestinal: Negative for nausea, vomiting, diarrhea or abdominal pain.  Genitourinary: Negative for dysuria, urgency, frequency, hematuria or flank pain.  Extremities:  Positive for back pain  Neurologic:  Negative for change in sensory or motor function.  Negative for headache.  Endocrine: Negative for heat or cold intolerance, weight loss or gain.  Hematological: Negative for bleeding, bruising or adenopathy.  Psychiatric: Negative for change in affect, change in mentation or sleep disturbance.  All other systems reviewed and otherwise negative unless noted.      Last Recorded Vitals  Vitals:    02/28/22 0953   BP: (!) 165/93   BP Location: LUE - Left upper extremity   Patient Position: Sitting   Cuff Size: Regular   Pulse: 68   Resp: 18   Temp: 97.2 °F (36.2 °C)   TempSrc: Temporal   SpO2: 100%   Weight: 79.8 kg (176 lb)        Physical Exam  Vitals and nursing note reviewed.   Constitutional:       General: She is not in acute distress.     Appearance: Normal appearance. She is not ill-appearing.   HENT:      Head: Normocephalic and atraumatic.      Nose: Nose normal.      Mouth/Throat:      Mouth: Mucous membranes are moist.      Pharynx: No oropharyngeal exudate or posterior oropharyngeal erythema.   Eyes:      Conjunctiva/sclera: Conjunctivae normal.      Pupils: Pupils are equal, round, and reactive to light.   Cardiovascular:      Rate and Rhythm: Normal rate and regular rhythm.      Heart sounds: Normal heart sounds.   Pulmonary:      Effort: Pulmonary effort is normal. No respiratory distress.      Breath sounds: Normal breath sounds. No wheezing.   Abdominal:      Comments: No  masses/tenderenss/distension noted. No RLQ or LLQ T   Musculoskeletal:         General: Normal range of motion.        Arms:       Cervical back: Normal range of motion and neck supple.      Comments: Left PSIS T + spasm noted   Lymphadenopathy:      Cervical: No cervical adenopathy.   Skin:     General: Skin is warm and dry.      Findings: No ecchymosis or lesion.   Neurological:      Mental Status: She is alert.      Gait: Gait is intact.   Psychiatric:         Mood and Affect: Mood and affect normal.         Judgment: Judgment normal.      A/P: uti vs spasm vs fx. I do not feel she has a fracture. Will get UA        Imaging:        Labs  Results for orders placed or performed in visit on 02/28/22   POCT URINE DIP NON-AUTO   Result Value Ref Range    POCT Color Yellow     POCT Appearance Clear     POCT Glucose Urine Negative Negative mg/dL    POCT Bilirubin Negative Negative    POCT Ketones Negative Negative mg/dL    POCT Specific Gravity 1.020 1.005 - 1.030    POCT Occult Blood Trace - Intact (A) Negative, 10 Popeye/uL, 25 Popeye/uL, 80 Popeye/uL, 200 Popeye/uL, 15 Milagro/uL, 70  Milagro/uL, 125  Milagro/uL, 500  Milagro/uL    POCT pH 6.0 5 - 7    POCT Protein Negative Negative mg/dL    POCT Urobilinogen 0.2 0.0 - 1.0 mg/dL    Urine Nitrite Negative Negative    WBC (Leukocyte) Esterase POC Trace (A) Negative, 10 Popeye/uL, 25 Popeye/uL, 80 Popeye/uL, 200 Popeye/uL, 15 Milagro/uL, 70  Milagro/uL, 125  Milagro/uL, 500  Milagro/uL        Differential Diagnosis/MDM:  Diagnoses that have been ruled out:   None   Diagnoses that are still under consideration:   None   Final diagnoses:   1. Generalized abdominal pain    2. Acute cystitis with hematuria    3. Back muscle spasm            Diagnosis:   (R10.84) Generalized abdominal pain  (primary encounter diagnosis)  Plan: POCT URINE DIP NON-AUTO    (N30.01) Acute cystitis with hematuria  Plan: sulfamethoxazole-trimethoprim (Bactrim DS)         800-160 MG per tablet, fluconazole (DIFLUCAN)         150 MG tablet    (M62.830)  Back muscle spasm  Plan: cyclobenzaprine (FLEXERIL) 10 MG tablet      New Prescriptions    CYCLOBENZAPRINE (FLEXERIL) 10 MG TABLET    Take 1 tablet by mouth 3 times daily as needed for Muscle spasms.    FLUCONAZOLE (DIFLUCAN) 150 MG TABLET    Take 1 tablet by mouth 1 time for 1 dose.    SULFAMETHOXAZOLE-TRIMETHOPRIM (BACTRIM DS) 800-160 MG PER TABLET    Take 1 tablet by mouth 2 times daily for 5 days.       Follow-up Information    None           Patient Instructions   Patient Education     Back Spasm (No Trauma)    Spasm of the back muscles can occur after a sudden forceful twisting or bending such as in a car accident. A spasm can also happen after a simple awkward movement, or after lifting something heavy with poor body positioning. In any case, muscle spasm adds to the pain. Sleeping in an awkward position or on a poor quality mattress can also cause this. Some people respond to emotional stress by tensing the muscles of their back.  Pain that continues may need further assessment or other types of treatment such as physical therapy.  You don't always need X-rays for the first assessment of back pain, unless you had a physical injury such as from a car accident or fall. If your pain continues and doesn't respond to medical treatment, X-rays and other tests may then be done.   Home care  · As soon as possible, start sitting or walking again. This will help prevent problems from a long bed rest. These problems include muscle weakness, worsening back stiffness and pain, and blood clots in the legs.  · When in bed, try to find a position of comfort. A firm mattress is best. Try lying flat on your back with pillows under your knees. You can also try lying on your side with your knees bent up toward your chest and a pillow between your knees.  · Don't sit for long periods. Also limit car rides and travel. This puts more stress on the lower back than standing or walking.   · During the first 24 to 72 hours after an  injury or flare-up, put an ice pack on the painful area for 20 minutes, then remove it for 20 minutes. Do this over a period of 60 to 90 minutes, or several times a day. This will reduce swelling and pain. Always wrap ice packs in a thin towel.  · You can start with ice, then switch to heat. Heat from a hot shower, hot bath, or heating pad reduces pain and works well for muscle spasms. Put heat on the painful area for 20 minutes, then remove it for 20 minutes. Do this over a period of 60 to 90 minutes, or several times a day. Don't sleep on a heating pad. It can burn or damage skin.  · Alternate using ice and heat.  · Be aware of safe lifting methods. don't lift anything over 15 pounds until all the pain is gone.  Gentle stretching will help your back heal faster. Do this simple routine 2 to 3 times a day until your back is feeling better.  · Lie on your back with your knees bent and both feet on the ground.  · Slowly raise your left knee to your chest as you flatten your lower back against the floor. Hold for 20 to 30 seconds.  · Relax and repeat the exercise with your right knee.  · Do 2 to 3 of these exercises for each leg.  · Repeat, hugging both knees to your chest at the same time.  · Don't bounce, but use a gentle pull.  Medicines  Talk with your doctor before using medicine, especially if you have other medical problems or are taking other medicines.  You may use over-the-counter medicines such as acetaminophen, ibuprofen, or naprosyn to control pain, unless your healthcare provider prescribed another pain medicine. Talk with your healthcare provider if you have a chronic condition such as diabetes, liver or kidney disease, stomach ulcer, or digestive bleeding, or are taking blood thinners.  Be careful if you are given prescription pain medicine, opioids, or medicine for muscle spasm. They can cause drowsiness, and affect your coordination, reflexes, and judgment. Don't drive or operate heavy machinery when  taking these medicines. Take pain medicine only as prescribed by your healthcare provider.  Follow-up care  Follow up with your doctor, or as advised. You may need physical therapy or more tests.  If X-rays were taken, they may be reviewed by a radiologist. You will be told of any new findings that may affect your care.  Call   Call if any of these occur:  · Trouble breathing  · Confusion  · Drowsiness or trouble awakening  · Fainting or loss of consciousness  · Rapid or very slow heart rate  · Loss of bowel or bladder control  When to seek medical advice  Call your healthcare provider right away if any of these occur:  · Pain becomes worse or spreads to your legs  · Weakness or numbness in one or both legs  · Numbness in the groin or genital area  · Fever of 100.4ºF (38ºC) or higher , or as directed by your healthcare provider  · Chills  · Burning or pain when passing urine  Jose Alfredo last reviewed this educational content on 11/1/2018 © 2000-2021 [employer]. All rights reserved. This information is not intended as a substitute for professional medical care. Always follow your healthcare professional's instructions.    Patient Education     Bladder Infection, Female (Adult)     Urine normally doesn't have any germs (bacteria) in it. But bacteria can get into the urinary tract from the skin around the rectum. Or they can travel in the blood from other parts of the body. Once they are in your urinary tract, they can cause infection in these areas:  · The urethra (urethritis)  · The bladder (cystitis)  · The kidneys (pyelonephritis)  The most common place for an infection is in the bladder. This is called a bladder infection. This is one of the most common infections in women. Most bladder infections are easily treated. They are not serious unless the infection spreads to the kidney.  The terms bladder infection, UTI, and cystitis are often used to describe the same thing. But they are not always the  same. Cystitis is an inflammation of the bladder. The most common cause of cystitis is an infection.  Symptoms  The infection causes inflammation in the urethra and bladder. This causes many of the symptoms. The most common symptoms of a bladder infection are:  · Pain or burning when urinating  · Having to urinate more often than normal  · Urgent need to urinate  · Only a small amount of urine comes out  · Blood in urine  · Belly (abdominal) discomfort. This is often in the lower belly above the pubic bone.  · Cloudy urine  · Strong- or bad-smelling urine  · Unable to urinate (urinary retention)  · Unable to hold urine in (urinary incontinence)  · Fever  · Loss of appetite  · Confusion (in older adults)  Causes  Bladder infections are not contagious. You can't get one from someone else, from a toilet seat, or from sharing a bath.  The most common cause of bladder infections is bacteria from the bowels. The bacteria get onto the skin around the opening of the urethra. From there, they can get into the urine. Then they travel up to the bladder, causing inflammation and infection. This often happens because of:  · Wiping incorrectly after urinating. Always wipe from front to back.  · Bowel incontinence  · Pregnancy  · Procedures such as having a catheter put in  · Older age  · Not emptying your bladder. This can give bacteria a chance to grow in your urine.  · Fluid loss (dehydration)  · Constipation  · Having sex  · Using a diaphragm for birth control   Treatment  Bladder infections are diagnosed by a urine test and urine culture. They are treated with antibiotics. They often clear up quickly without problems. Treatment helps prevent a more serious kidney infection.  Medicines  Medicines can help in the treatment of a bladder infection:  · Take antibiotics until they are used up, even if you feel better. It's important to finish them to make sure the infection has cleared.  · You can use acetaminophen or ibuprofen for  pain, fever, or discomfort, unless another medicine was prescribed. If you have long-term (chronic) liver or kidney disease, talk with your healthcare provider before using these medicines. Also talk with your provider if you've ever had a stomach ulcer or GI (gastrointestinal) bleeding, or are taking blood-thinner medicines.  · If you are given phenazopydridine to reduce burning with urination, it will make your urine a bright orange color. This can stain clothing.  Care and prevention  These self-care steps can help prevent future infections:  · Drink plenty of fluids. This helps to prevent dehydration and flush out your bladder. Do this unless you must restrict fluids for other health reasons, or your healthcare provider told you not to.  · Clean yourself correctly after going to the bathroom. Wipe from front to back after using the toilet. This helps prevent the spread of bacteria.  · Urinate more often. Don't try to hold urine in for a long time.  · Wear loose-fitting clothes and cotton underwear. Don't wear tight-fitting pants.  · Improve your diet and prevent constipation. Eat more fresh fruits and vegetables, and fiber. Eat less junk foods and fatty foods.  · Don't have sex until your symptoms are gone.  · Don't have caffeine, alcohol, and spicy foods. These can irritate your bladder.  · Urinate right after you have sex to flush out your bladder.  · If you use birth control pills and have frequent bladder infections, discuss it with your healthcare provider.  Follow-up care  Call your healthcare provider if all symptoms are not gone after 3 days of treatment. This is especially important if you have repeat infections.  If a culture was done, you will be told if your treatment needs to be changed. If directed, you can call to find out the results.  If X-rays were done, you will be told if the results will affect your treatment.  Call 911  Call 911 if any of the following occur:  · Trouble breathing  · Hard to  wake up or confusion  · Fainting (loss of consciousness)  · Fast heart rate  When to get medical advice  Call your healthcare provider right away if any of these occur:  · Fever of 100.4ºF (38.0ºC) or higher, or as directed by your healthcare provider  · Symptoms are not better after 3 days of treatment  · Back or belly pain that gets worse  · Repeated vomiting, or unable to keep medicine down  · Weakness or dizziness  · Vaginal discharge  · Pain, redness, or swelling in the outer vaginal area (labia)  [employer] last reviewed this educational content on 11/1/2019  © 4574-9284 [employer]. All rights reserved. This information is not intended as a substitute for professional medical care. Always follow your healthcare professional's instructions.    Please go directly to the er if you feel as if you are going to throw up/vomiting/fevers or you feel worse than when you caem today.                Primary Care Physician  MD Hortencia Whitaker, DO       electronic

## 2022-05-16 NOTE — H&P PST ADULT - DOES THIS PATIENT HAVE A HISTORY OF OR HAS BEEN DX WITH HEART FAILURE?
[] : A student assisted with documenting this visit. I have reviewed and verified all information documented by the student, and made modifications to such information, when appropriate. no

## 2022-10-31 ENCOUNTER — APPOINTMENT (OUTPATIENT)
Dept: CARDIOLOGY | Facility: CLINIC | Age: 42
End: 2022-10-31

## 2023-08-15 NOTE — BRIEF OPERATIVE NOTE - OPERATION/FINDINGS
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 Excellent hemostasis
[Negative] : Heme/Lymph

## 2024-01-10 ENCOUNTER — NON-APPOINTMENT (OUTPATIENT)
Age: 44
End: 2024-01-10

## 2024-01-11 NOTE — H&P PST ADULT - GENITOURINARY
Situation:   Outreach for routine follow up.    Key Assessments:  Pt reported he is doing \"good.\"  Pt was seen by Cardiology and d/t low stroke risk, he does not require the Watchman.  Rather, his Xarelto was discontinued and pt to take ASA 81mg daily and Plavix.  Pt was glad to hear this information.      Pt reported he received a letter and spoke with a rep re: sleep study exam.  Pt must be able to sleep at least 5 hours for exam and currently pts sleep pattern involves sleeping for one hour before waking and this occurs throughout the sleep cycle.  Pt is placing the sleep study on hold at this time.      Pt had his last B-12 injection at his 12.27.23 visit with PCP.  Pt reported he did go today for his CBC/BMP lab work.  Pt aware his B-12 level will be checked 4 weeks post last injection.  Pt's fatigue \"is not as bad.\"  Pt is scheduled for PCP f/u on 1.24.24. Pt also reported he has lost 11#.  Previous wt was 248# and yesterday pts wt measured 237#.  Great job!  Pt mentioned he has been watching what he eats.        Actions Taken: Completed general assessment, SDOH, PHQ/2, disease process and goal established. POC/goals of care discussed.  Pt agreed. Case reviewed.  Used MI and therapeutic listening.  All tasks completed.     Glad to hear pt is doing \"good.\"  Pt being followed by Cardiology.     Pt will place sleep study on Hold at this time.      Pt will be notified of lab results by PCP office.  Pt confirmed upcoming PCP visit.  Kudos to pt for 11# wt loss.  Continue to monitor daily food intake and increase fruit and veggies into daily diet.        Program plan: RNCM will f/u with pt in one week for further screening and address any concerns/questions at that time.        Next follow up:  1.18.24    See hyperlinks within encounter for full documentation.     negative

## 2024-02-28 NOTE — H&P PST ADULT - BACK
Patient ID: Jake is a 46 year old male.  MRN: [id number]  Chief Complaint   Patient presents with    Physical     HISTORY OF PRESENT ILLNESS  HPI    Physical  Previous chest pain with anxiety about a year ago.  Cardiac workup as well as his meds labs were unremarkable except for slight hyperlipidemia with low ASCVD as noted below:      The 10-year ASCVD risk score (Melody CANO Jr., et al., 2013) is: 1.2%    Values used to calculate the score:      Age: 44 years      Sex: Male      Is Non- : No      Diabetic: No      Tobacco smoker: No      Systolic Blood Pressure: 124 mmHg      Is BP treated: No      HDL Cholesterol: 63 mg/dL      Total Cholesterol: 221 mg/dL        Because of his low cardiac risk factors as well as some underlying anxiety and stress secondary to not being able to see his family which lives mainly in [place], Lexapro was started with improvement in terms of his symptoms.  He took it for 2 months then discontinued it.      Has right sided plantar pain after walking for extended periods of time since Nov 2023.     Deferred vaccines.    No family history of colon cancer, blood in stool or melena.  No previous colonoscopies to review.  Slight abdominal discomfort from time to time followed by diarrhea.      Review of Systems   All other systems reviewed and are negative.    Please see HPI for further ROS.       ALLERGIES  ALLERGIES:  No Known Allergies    MEDICAL HISTORY  History reviewed. No pertinent past medical history.  Patient Active Problem List   Diagnosis    Health maintenance examination    Stress    Vitamin D deficiency    Plantar fasciitis of right foot    Non-seasonal allergic rhinitis     SURGICAL HISTORY  History reviewed. No pertinent surgical history.  FAMILY HISTORY  History reviewed. No pertinent family history.  SOCIAL HISTORY  Social History     Socioeconomic History    Marital status: Single     Spouse name: Not on file    Number of children: Not on file    Years  of education: Not on file    Highest education level: Not on file   Occupational History    Not on file   Tobacco Use    Smoking status: Never    Smokeless tobacco: Never   Substance and Sexual Activity    Alcohol use: Yes    Drug use: Never    Sexual activity: Not on file   Other Topics Concern    Not on file   Social History Narrative    Not on file     Social Determinants of Health     Financial Resource Strain: Not on file   Food Insecurity: Not on file   Transportation Needs: Not on file   Physical Activity: Not on file   Stress: Not on file   Social Connections: Not on file   Interpersonal Safety: Not on file     CURRENT MEDICATIONS  Current Outpatient Medications   Medication Sig Dispense Refill    naproxen (NAPROSYN) 500 MG tablet Take 1 tablet by mouth 2 times daily as needed for Pain. 30 tablet 1    fluticasone (FLONASE) 50 MCG/ACT nasal spray Spray 1 spray in each nostril daily. 15.8 mL 2     No current facility-administered medications for this visit.       Patient's medications, allergies, past medical, surgical, social and family histories were reviewed and updated as appropriate    OBJECTIVE  Vitals:    02/28/24 1527   BP: 122/78   BP Location: LUE - Left upper extremity   Patient Position: Sitting   Cuff Size: Regular   Pulse: 65   Temp: 97.6 °F (36.4 °C)   SpO2: 98%   Weight: 73.2 kg (161 lb 4.8 oz)   Height: 5' 4\" (1.626 m)     Physical Exam  Vitals and nursing note reviewed.   Constitutional:       General: He is not in acute distress.     Appearance: Normal appearance. He is not ill-appearing.   HENT:      Right Ear: Tympanic membrane normal.      Left Ear: Tympanic membrane normal.      Nose: No congestion or rhinorrhea.      Mouth/Throat:      Pharynx: No oropharyngeal exudate or posterior oropharyngeal erythema.   Eyes:      Extraocular Movements: Extraocular movements intact.   Cardiovascular:      Rate and Rhythm: Normal rate and regular rhythm.      Pulses: Normal pulses.      Heart sounds:  Normal heart sounds. No murmur heard.  Pulmonary:      Effort: Pulmonary effort is normal. No respiratory distress.      Breath sounds: Normal breath sounds. No wheezing or rales.   Abdominal:      General: Abdomen is flat. Bowel sounds are normal. There is no distension.      Palpations: Abdomen is soft.      Tenderness: There is no abdominal tenderness. There is no right CVA tenderness, left CVA tenderness or guarding.   Skin:     Capillary Refill: Capillary refill takes less than 2 seconds.   Neurological:      General: No focal deficit present.      Mental Status: He is alert. Mental status is at baseline.   Psychiatric:         Mood and Affect: Mood normal.         Behavior: Behavior normal.         Thought Content: Thought content normal.         Judgment: Judgment normal.          All pertinent laboratory results were reviewed.    I have reviewed and discussed with patient previous records, labs, and imaging.    ASSESSMENT AND PLAN  Problem List Items Addressed This Visit          ENT    Non-seasonal allergic rhinitis    Relevant Medications    fluticasone (FLONASE) 50 MCG/ACT nasal spray       Endocrine and Metabolic    Vitamin D deficiency    Relevant Orders    Vitamin D -25 Hydroxy       Health Encounters    Health maintenance examination - Primary     -deferred immunizations.    -blood pressure at goal, will do health maintenance labs to further optimize his cardiac risk factors.    -Flonase to be added to an antihistamine for allergic rhinitis.    -home exercises given for plantar fasciitis of the right foot along with naproxen 500 mg b.i.d. p.r.n..    -can start colon cancer screening next year.         Relevant Orders    Lipid Panel With Reflex    Comprehensive Metabolic Panel       Musculoskeletal and Injuries    Plantar fasciitis of right foot    Relevant Medications    naproxen (NAPROSYN) 500 MG tablet       Schedule follow up: Return in about 1 year (around 2/28/2025) for cpe, labs today.    Davon  MD Denny   No deformity or limitation of movement

## 2024-11-15 NOTE — ED ADULT NURSE NOTE - NS_ED_NURSE_TEACHING_TOPIC_ED_A_ED
What Is The Reason For Today's Visit?: Full Body Skin Examination What Is The Reason For Today's Visit? (Being Monitored For X): concerning skin lesions on an annual basis by MD Weil